# Patient Record
Sex: MALE
[De-identification: names, ages, dates, MRNs, and addresses within clinical notes are randomized per-mention and may not be internally consistent; named-entity substitution may affect disease eponyms.]

---

## 2022-06-03 PROBLEM — Z00.00 ENCOUNTER FOR PREVENTIVE HEALTH EXAMINATION: Status: ACTIVE | Noted: 2022-06-03

## 2022-08-11 ENCOUNTER — APPOINTMENT (OUTPATIENT)
Dept: PULMONOLOGY | Facility: CLINIC | Age: 77
End: 2022-08-11

## 2022-08-11 VITALS
HEART RATE: 74 BPM | WEIGHT: 199 LBS | BODY MASS INDEX: 30.16 KG/M2 | HEIGHT: 68 IN | OXYGEN SATURATION: 95 % | TEMPERATURE: 98 F

## 2022-08-11 VITALS — HEART RATE: 16 BPM

## 2022-08-11 DIAGNOSIS — C22.1 INTRAHEPATIC BILE DUCT CARCINOMA: ICD-10-CM

## 2022-08-11 DIAGNOSIS — J44.9 CHRONIC OBSTRUCTIVE PULMONARY DISEASE, UNSPECIFIED: ICD-10-CM

## 2022-08-11 DIAGNOSIS — I10 ESSENTIAL (PRIMARY) HYPERTENSION: ICD-10-CM

## 2022-08-11 DIAGNOSIS — E78.5 HYPERLIPIDEMIA, UNSPECIFIED: ICD-10-CM

## 2022-08-11 PROCEDURE — 94729 DIFFUSING CAPACITY: CPT

## 2022-08-11 PROCEDURE — ZZZZZ: CPT

## 2022-08-11 PROCEDURE — 94726 PLETHYSMOGRAPHY LUNG VOLUMES: CPT

## 2022-08-11 PROCEDURE — 94060 EVALUATION OF WHEEZING: CPT

## 2022-08-11 PROCEDURE — 99204 OFFICE O/P NEW MOD 45 MIN: CPT | Mod: 25

## 2022-08-12 PROBLEM — C22.1 CHOLANGIOCARCINOMA: Status: ACTIVE | Noted: 2022-08-12

## 2022-08-12 PROBLEM — I10 HYPERTENSION: Status: ACTIVE | Noted: 2022-08-12

## 2022-08-12 PROBLEM — E78.5 HYPERLIPIDEMIA: Status: ACTIVE | Noted: 2022-08-12

## 2022-08-12 RX ORDER — AMLODIPINE BESYLATE 5 MG/1
5 TABLET ORAL
Qty: 90 | Refills: 0 | Status: ACTIVE | COMMUNITY
Start: 2021-11-22

## 2022-08-12 RX ORDER — SODIUM FLUORIDE 1.1 G/100G
1.1 GEL, DENTIFRICE ORAL
Qty: 51 | Refills: 0 | Status: ACTIVE | COMMUNITY
Start: 2022-02-24

## 2022-08-12 RX ORDER — CILOSTAZOL 100 MG/1
100 TABLET ORAL
Qty: 180 | Refills: 0 | Status: ACTIVE | COMMUNITY
Start: 2021-12-08

## 2022-08-12 RX ORDER — OMEPRAZOLE 40 MG/1
40 CAPSULE, DELAYED RELEASE ORAL
Qty: 30 | Refills: 0 | Status: ACTIVE | COMMUNITY
Start: 2022-03-07

## 2022-08-12 RX ORDER — AZITHROMYCIN 250 MG/1
250 TABLET, FILM COATED ORAL
Qty: 6 | Refills: 0 | Status: ACTIVE | COMMUNITY
Start: 2022-07-21

## 2022-08-12 RX ORDER — DICLOFENAC EPOLAMINE 0.01 G/1
1.3 SYSTEM TOPICAL
Qty: 60 | Refills: 0 | Status: ACTIVE | COMMUNITY
Start: 2022-01-23

## 2022-08-12 RX ORDER — METOPROLOL TARTRATE 25 MG/1
25 TABLET, FILM COATED ORAL
Qty: 180 | Refills: 0 | Status: ACTIVE | COMMUNITY
Start: 2021-11-22

## 2022-08-12 RX ORDER — CAPECITABINE 500 MG/1
500 TABLET ORAL
Qty: 84 | Refills: 0 | Status: ACTIVE | COMMUNITY
Start: 2022-03-04

## 2022-08-12 RX ORDER — CLOTRIMAZOLE 10 MG/ML
1 SOLUTION TOPICAL
Qty: 30 | Refills: 0 | Status: ACTIVE | COMMUNITY
Start: 2021-12-28

## 2022-08-12 RX ORDER — HYDROCORTISONE 1 %
12 CREAM (GRAM) TOPICAL
Qty: 225 | Refills: 0 | Status: ACTIVE | COMMUNITY
Start: 2021-12-28

## 2022-08-12 RX ORDER — SIMVASTATIN 20 MG/1
20 TABLET, FILM COATED ORAL
Qty: 90 | Refills: 0 | Status: ACTIVE | COMMUNITY
Start: 2021-06-24

## 2022-08-12 RX ORDER — LOSARTAN POTASSIUM 100 MG/1
100 TABLET, FILM COATED ORAL
Qty: 90 | Refills: 0 | Status: ACTIVE | COMMUNITY
Start: 2021-11-22

## 2022-08-12 RX ORDER — PANCRELIPASE 36000; 180000; 114000 [USP'U]/1; [USP'U]/1; [USP'U]/1
36000-114000 CAPSULE, DELAYED RELEASE PELLETS ORAL
Qty: 90 | Refills: 0 | Status: ACTIVE | COMMUNITY
Start: 2022-01-23

## 2022-08-12 RX ORDER — AMOXICILLIN AND CLAVULANATE POTASSIUM 875; 125 MG/1; MG/1
875-125 TABLET, COATED ORAL
Qty: 10 | Refills: 0 | Status: ACTIVE | COMMUNITY
Start: 2022-07-21

## 2022-08-12 RX ORDER — DICLOFENAC SODIUM 1% 10 MG/G
1 GEL TOPICAL
Qty: 300 | Refills: 0 | Status: ACTIVE | COMMUNITY
Start: 2022-02-10

## 2022-08-12 RX ORDER — FILGRASTIM-SNDZ 300 UG/.5ML
300 INJECTION, SOLUTION INTRAVENOUS; SUBCUTANEOUS
Qty: 2 | Refills: 0 | Status: ACTIVE | COMMUNITY
Start: 2022-04-02

## 2022-08-12 RX ORDER — PANTOPRAZOLE 40 MG/1
40 TABLET, DELAYED RELEASE ORAL
Qty: 90 | Refills: 0 | Status: ACTIVE | COMMUNITY
Start: 2022-03-31

## 2022-08-12 RX ORDER — CLOTRIMAZOLE AND BETAMETHASONE DIPROPIONATE 10; .5 MG/G; MG/G
1-0.05 CREAM TOPICAL
Qty: 45 | Refills: 0 | Status: ACTIVE | COMMUNITY
Start: 2021-12-28

## 2022-08-12 RX ORDER — MELOXICAM 15 MG/1
15 TABLET ORAL
Qty: 30 | Refills: 0 | Status: ACTIVE | COMMUNITY
Start: 2022-02-10

## 2022-08-12 NOTE — ASSESSMENT
[FreeTextEntry1] : 77 year old male, former smoker, remote history of of TB, and completed RIPE, referred for abnormal CT chest.\par He has prior imaging showing apical opacities but more recently found to have new findings in RLL. This may be infectious in nature and has already completed a course of antibiotics and is pending repeat imaging in several weeks.  We will follow-up with the patient after the repeat CT is available.  If there is no resolution then interventional pulmonary will be consulted for possible biopsy.  We will start Spiriva for moderate COPD found on PFTs.  We will follow-up with the patient after the CT chest is available\par \par UTD with flu (2021), pneumococcal, and COVID vaccination.

## 2022-08-12 NOTE — REVIEW OF SYSTEMS
[SOB on Exertion] : sob on exertion [Negative] : Gastrointestinal [Cough] : no cough [Hemoptysis] : no hemoptysis [Sputum] : no sputum [Dyspnea] : no dyspnea

## 2022-08-12 NOTE — REASON FOR VISIT
[Consultation] : a consultation [Abnormal CXR/ Chest CT] : an abnormal CXR/ chest CT [TextBox_44] : Referred by Dr. Jose Tamez

## 2022-08-12 NOTE — HISTORY OF PRESENT ILLNESS
[Former] : former [TextBox_4] : This is a 77 year old male referred for abnormal CT chest.\par \par Comorbid medical conditions include cholangiocarcinoma. \par \par Patient is a former smoker, 40+ pack year history, quit 12 years prior. He was diagnosed with cholangiocarcinoma about 1 year prior and is s/p chemo and RT. On recent follow up imaging in 5/2022, he was found to have a new lesion in CT chest so he was referred for further management. He states that in 1996, he was found to have abnormal findings in his lungs thought to be malignancy. At that time, a bronchoscopy was performed and he was told that it is not malignancy but the findings were due to scarring from a prior history of tuberculosis. However, about  7 - 8 years ago, he was treated for RIPE for 6 months for presumably the same findings, and his entire family had to be treated as well.\par \par PET CT (9/2/2021) bilateral emphysematous changes. Bilateral patchy apical opacities (SUV max 4.4). Mediastinal and hilar lymph nodes (SUV max 8.2) may be reactive.\par CT chest (12/23/2021) mediastinal lymph nodes, not significantly changed. Moderate scarring of upper lobes, with nodular component, not significantly changed.\par CT chest (5/25/22) moderate emphysema, with severe scarring of upper lobes, calcified granulomas, focal bronchiectasis. New 5 cm partially solid ground glass opacity in medial right lower low.\par PET CT (6/21/2022) Worsening ill-defined subpleural airspace disease at right base (SUV max 3.2). Emphysema. Nodular opacities in bilateral apices (SUV max 4.4) unchanged.\par \par Patient has no pulmonary complaints. Some dyspnea with exertion. Was started on a course of antibiotics after last PET/CT and has a follow-up CT scheduled in a few weeks.\par \par PFT (8/11/2022) pre: FEV1 1.79 (67%), FVC 3.06 (81%), FEV1/FVC 59%, post: FEV1 1.81 (68%), FVC 3.12 (83%), FEV1/FVC 58%, TLC 5.24 (83%), DLCO 12 (51%) [TextBox_11] : 1 - 2 [TextBox_13] : 40+ [YearQuit] : 2010

## 2022-08-12 NOTE — PHYSICAL EXAM
[No Acute Distress] : no acute distress [Normal Oropharynx] : normal oropharynx [I] : Mallampati Class: I [Normal Appearance] : normal appearance [Normal Rate/Rhythm] : normal rate/rhythm [Normal S1, S2] : normal s1, s2 [No Murmurs] : no murmurs [No Resp Distress] : no resp distress [Clear to Auscultation Bilaterally] : clear to auscultation bilaterally [No Abnormalities] : no abnormalities [No Clubbing] : no clubbing [No Cyanosis] : no cyanosis [No Edema] : no edema [FROM] : FROM [Normal Color/ Pigmentation] : normal color/ pigmentation [No Focal Deficits] : no focal deficits [Oriented x3] : oriented x3 [Normal Affect] : normal affect

## 2022-08-12 NOTE — CONSULT LETTER
[Dear  ___] : Dear  [unfilled], [Consult Letter:] : I had the pleasure of evaluating your patient, [unfilled]. [Please see my note below.] : Please see my note below. [Consult Closing:] : Thank you very much for allowing me to participate in the care of this patient.  If you have any questions, please do not hesitate to contact me. [Sincerely,] : Sincerely, [FreeTextEntry3] : Harper Butler MD\par Pulmonary, Critical Care, and Sleep Medicine\par Associate Medical Director, [facility]\par  of Medicine\par [employer] at [facility]

## 2022-08-30 ENCOUNTER — APPOINTMENT (OUTPATIENT)
Dept: PULMONOLOGY | Facility: CLINIC | Age: 77
End: 2022-08-30

## 2022-09-06 ENCOUNTER — APPOINTMENT (OUTPATIENT)
Dept: PULMONOLOGY | Facility: CLINIC | Age: 77
End: 2022-09-06

## 2022-09-06 PROCEDURE — 99441: CPT

## 2022-09-28 ENCOUNTER — APPOINTMENT (OUTPATIENT)
Dept: PULMONOLOGY | Facility: CLINIC | Age: 77
End: 2022-09-28

## 2023-02-23 ENCOUNTER — RX RENEWAL (OUTPATIENT)
Age: 78
End: 2023-02-23

## 2023-02-23 RX ORDER — TIOTROPIUM BROMIDE 18 UG/1
18 CAPSULE ORAL; RESPIRATORY (INHALATION) DAILY
Qty: 30 | Refills: 1 | Status: ACTIVE | COMMUNITY
Start: 2022-08-11 | End: 1900-01-01